# Patient Record
Sex: FEMALE | Race: BLACK OR AFRICAN AMERICAN | Employment: FULL TIME | URBAN - METROPOLITAN AREA
[De-identification: names, ages, dates, MRNs, and addresses within clinical notes are randomized per-mention and may not be internally consistent; named-entity substitution may affect disease eponyms.]

---

## 2017-02-27 ENCOUNTER — HOSPITAL ENCOUNTER (OUTPATIENT)
Dept: LAB | Age: 27
Discharge: HOME | End: 2017-02-27
Payer: COMMERCIAL

## 2017-02-27 ENCOUNTER — OFFICE VISIT (OUTPATIENT)
Dept: INTERNAL MEDICINE CLINIC | Age: 27
End: 2017-02-27

## 2017-02-27 VITALS
OXYGEN SATURATION: 99 % | WEIGHT: 270 LBS | SYSTOLIC BLOOD PRESSURE: 124 MMHG | BODY MASS INDEX: 46.1 KG/M2 | HEIGHT: 64 IN | DIASTOLIC BLOOD PRESSURE: 84 MMHG | RESPIRATION RATE: 20 BRPM | TEMPERATURE: 98.5 F | HEART RATE: 80 BPM

## 2017-02-27 DIAGNOSIS — Z12.4 SCREENING FOR MALIGNANT NEOPLASM OF CERVIX: ICD-10-CM

## 2017-02-27 DIAGNOSIS — Z01.419 WELL WOMAN EXAM WITH ROUTINE GYNECOLOGICAL EXAM: ICD-10-CM

## 2017-02-27 DIAGNOSIS — Z12.39 SCREENING FOR MALIGNANT NEOPLASM OF BREAST: ICD-10-CM

## 2017-02-27 PROCEDURE — 88175 CYTOPATH C/V AUTO FLUID REDO: CPT | Performed by: FAMILY MEDICINE

## 2017-02-27 RX ORDER — TRIAMCINOLONE ACETONIDE 1 MG/G
OINTMENT TOPICAL
Qty: 45 G | Refills: 11 | Status: SHIPPED | OUTPATIENT
Start: 2017-02-27

## 2017-02-27 RX ORDER — ALBUTEROL SULFATE 90 UG/1
1 AEROSOL, METERED RESPIRATORY (INHALATION)
Qty: 1 INHALER | Refills: 11 | Status: SHIPPED | OUTPATIENT
Start: 2017-02-27 | End: 2017-06-07 | Stop reason: SDUPTHER

## 2017-02-27 NOTE — PROGRESS NOTES
1. Have you been to the ER, urgent care clinic since your last visit? Hospitalized since your last visit? No.    2. Have you seen or consulted any other health care providers outside of the [address] [place] [address] since your last visit? Include any pap smears or colon screening.  No.

## 2017-02-27 NOTE — MR AVS SNAPSHOT
Visit Information Date & Time Provider Department Dept. Phone Encounter #  
 2/27/2017  3:00 PM Annabelle Harvey, [address]  [id number] Follow-up Instructions Return if symptoms worsen or fail to improve. Upcoming Health Maintenance Date Due DTaP/Tdap/Td series (1 - Tdap) 2/2/2010 PAP AKA CERVICAL CYTOLOGY 10/2/2016 Allergies as of 2/27/2017  Review Complete On: 2/27/2017 By: Annabelle Harvey MD  
 No Known Allergies Current Immunizations  Never Reviewed No immunizations on file. Not reviewed this visit You Were Diagnosed With   
  
 Codes Comments Well woman exam with routine gynecological exam     ICD-10-CM: J33.267 ICD-9-CM: V72.31 [V72.31] Screening for malignant neoplasm of cervix     ICD-10-CM: Z12.4 ICD-9-CM: V76.2 Screening for malignant neoplasm of breast     ICD-10-CM: Z12.39 
ICD-9-CM: V76.10 Vitals BP  
  
  
  
  
  
 124/84 (BP 1 Location: Right arm, BP Patient Position: Sitting) Vitals History BMI and BSA Data Body Mass Index Body Surface Area  
 46.35 kg/m 2 2.35 m 2 Preferred Pharmacy Pharmacy Name Phone [facility] [address] [place] [phone] Your Updated Medication List  
  
   
This list is accurate as of: 2/27/17  3:50 PM.  Always use your most recent med list.  
  
  
  
  
 fluticasone 50 mcg/actuation nasal spray Commonly known as:  Imagene Poot 2 Sprays by Both Nostrils route daily. Indications: ALLERGIC RHINITIS  
  
 SUMAtriptan 100 mg tablet Commonly known as:  IMITREX Take 1 Tab by mouth once as needed for Migraine for up to 1 dose. triamcinolone acetonide 0.1 % ointment Commonly known as:  KENALOG Apply 2-3 times a day. Use thin layer. Prescriptions Sent to Pharmacy Refills triamcinolone acetonide (KENALOG) 0.1 % ointment 11 Sig: Apply 2-3 times a day. Use thin layer. Class: Normal  
 Pharmacy: [facility] [address], [facility] [address] [place] [address] #: [phone] We Performed the Following PAP (IMAGE GUIDED), LIQUID-BASED [[id number] Custom] Follow-up Instructions Return if symptoms worsen or fail to improve. Introducing [facility] & HEALTH SERVICES! [employer] introduces [employer] patient portal. Now you can access parts of your medical record, email your doctor's office, and request medication refills online. 1. In your internet browser, go to [email or web address] 2. Click on the First Time User? Click Here link in the Sign In box. You will see the New Member Sign Up page. 3. Enter your Cortica Access Code exactly as it appears below. You will not need to use this code after youve completed the sign-up process. If you do not sign up before the expiration date, you must request a new code. · Cortica Access Code: [email or web address] Expires: 5/28/2017  3:05 PM 
 
4. Enter the last four digits of your Social Security Number (xxxx) and Date of Birth (mm/dd/yyyy) as indicated and click Submit. You will be taken to the next sign-up page. 5. Create a Cortica ID. This will be your Cortica login ID and cannot be changed, so think of one that is secure and easy to remember. 6. Create a Cortica password. You can change your password at any time. 7. Enter your Password Reset Question and Answer. This can be used at a later time if you forget your password. 8. Enter your e-mail address. You will receive e-mail notification when new information is available in [address] 9. Click Sign Up. You can now view and download portions of your medical record. 10. Click the Download Summary menu link to download a portable copy of your medical information. If you have questions, please visit the Frequently Asked Questions section of the [employer] website. Remember, [employer] is NOT to be used for urgent needs. For medical emergencies, dial 911. Now available from your iPhone and Android! Please provide this summary of care documentation to your next provider. Your primary care clinician is listed as Emily Brito. If you have any questions after today's visit, please call [phone].

## 2017-02-27 NOTE — PROGRESS NOTES
Subjective:   28 y.o. female for Well Woman Check. Patient's last menstrual period was 02/22/2017. Dx'd with uterine prolapse. Was getting PT for it and want sot go back. Regular menses. Doing med-weight loss and her iron counts were low. Started on Jan 23rd. Social History: single partner, contraception - none. ROS:  Feeling well. No dyspnea or chest pain on exertion. No abdominal pain, change in bowel habits, black or bloody stools. No urinary tract symptoms. GYN ROS: normal menses, no abnormal bleeding, pelvic pain or discharge, no breast pain or new or enlarging lumps on self exam. No neurological complaints. Objective:     Visit Vitals    /84 (BP 1 Location: Right arm, BP Patient Position: Sitting)    Pulse 80    Temp 98.5 °F (36.9 °C) (Oral)    Resp 20    Ht 5' 4\" (1.626 m)    Wt 270 lb (122.5 kg)    LMP 02/22/2017    SpO2 99%    BMI 46.35 kg/m2     The patient appears well, alert, oriented x 3, in no distress. ENT normal.  Neck supple. No adenopathy or thyromegaly. ANNE. Lungs are clear, good air entry, no wheezes, rhonchi or rales. S1 and S2 normal, no murmurs, regular rate and rhythm. Abdomen soft without tenderness, guarding, mass or organomegaly. Extremities show no edema, normal peripheral pulses. Neurological is normal, no focal findings. BREAST EXAM: breasts appear normal, no suspicious masses, no skin or nipple changes or axillary nodes    PELVIC EXAM: normal external genitalia, vulva, vagina, cervix, uterus and adnexa    Assessment/Plan:   well woman  pap smear  return annually or prn    ICD-10-CM ICD-9-CM    1. Well woman exam with routine gynecological exam Z01.419 V72.31 PAP (IMAGE GUIDED), LIQUID-BASED    [V72.31]   2. Screening for malignant neoplasm of cervix Z12.4 V76.2 PAP (IMAGE GUIDED), LIQUID-BASED   3. Screening for malignant neoplasm of breast Z12.39 V76.10    .

## 2017-03-06 RX ORDER — METRONIDAZOLE 500 MG/1
500 TABLET ORAL 2 TIMES DAILY
Qty: 14 TAB | Refills: 0 | Status: SHIPPED | OUTPATIENT
Start: 2017-03-06 | End: 2017-03-13

## 2017-06-07 ENCOUNTER — OFFICE VISIT (OUTPATIENT)
Dept: INTERNAL MEDICINE CLINIC | Age: 27
End: 2017-06-07

## 2017-06-07 VITALS
WEIGHT: 257 LBS | OXYGEN SATURATION: 100 % | HEIGHT: 64 IN | BODY MASS INDEX: 43.87 KG/M2 | HEART RATE: 71 BPM | TEMPERATURE: 97.9 F | SYSTOLIC BLOOD PRESSURE: 122 MMHG | DIASTOLIC BLOOD PRESSURE: 85 MMHG | RESPIRATION RATE: 20 BRPM

## 2017-06-07 DIAGNOSIS — J30.1 NON-SEASONAL ALLERGIC RHINITIS DUE TO POLLEN: ICD-10-CM

## 2017-06-07 DIAGNOSIS — N97.0 INFERTILITY ASSOCIATED WITH ANOVULATION: Primary | ICD-10-CM

## 2017-06-07 RX ORDER — FLUTICASONE PROPIONATE 50 MCG
2 SPRAY, SUSPENSION (ML) NASAL DAILY
Qty: 1 BOTTLE | Refills: 11 | Status: SHIPPED | OUTPATIENT
Start: 2017-06-07

## 2017-06-07 RX ORDER — ALBUTEROL SULFATE 90 UG/1
1 AEROSOL, METERED RESPIRATORY (INHALATION)
Qty: 1 INHALER | Refills: 11 | Status: SHIPPED | OUTPATIENT
Start: 2017-06-07

## 2017-06-07 NOTE — MR AVS SNAPSHOT
Visit Information Date & Time Provider Department Dept. Phone Encounter #  
 6/7/2017  2:15 PM Aysha Villanueva, [address] [id number] Your Appointments 2/23/2018  3:15 PM  
ROUTINE CARE with Aysha Villanueva MD  
[address]) Appt Note: 1 year follow up [place] [address] [place] [address] [id number] [phone]  
  
   
 [place] [address] [place] Upcoming Health Maintenance Date Due DTaP/Tdap/Td series (1 - Tdap) 2/2/2010 INFLUENZA AGE 9 TO ADULT 8/1/2017 PAP AKA CERVICAL CYTOLOGY 2/27/2020 Allergies as of 6/7/2017  Review Complete On: 6/7/2017 By: Cooper Blanton LPN No Known Allergies Current Immunizations  Never Reviewed No immunizations on file. Not reviewed this visit You Were Diagnosed With   
  
 Codes Comments Infertility associated with anovulation    -  Primary ICD-10-CM: N97.0 ICD-9-CM: 628.0 Non-seasonal allergic rhinitis due to pollen     ICD-10-CM: J30.1 ICD-9-CM: 477.0 Vitals BP Pulse Temp Resp Height(growth percentile) Weight(growth percentile) 122/85 (BP 1 Location: Left arm, BP Patient Position: Sitting) 71 97.9 °F (36.6 °C) (Oral) 20 5' 4\" (1.626 m) 257 lb (116.6 kg) SpO2 BMI OB Status Smoking Status 100% 44.11 kg/m2 Having regular periods Never Smoker Vitals History BMI and BSA Data Body Mass Index Body Surface Area  
 44.11 kg/m 2 2.29 m 2 Preferred Pharmacy Pharmacy Name Phone [facility] [address] [facility], [address] [place] [address] [phone] Your Updated Medication List  
  
   
This list is accurate as of: 6/7/17  3:32 PM.  Always use your most recent med list.  
  
  
  
  
 albuterol 90 mcg/actuation inhaler Commonly known as:  PROVENTIL HFA, VENTOLIN HFA, PROAIR HFA  
 Take 1 Puff by inhalation every six (6) hours as needed for Wheezing. fluticasone 50 mcg/actuation nasal spray Commonly known as:  Nya Lynch 2 Sprays by Both Nostrils route daily. Indications: ALLERGIC RHINITIS  
  
 triamcinolone acetonide 0.1 % ointment Commonly known as:  KENALOG Apply 2-3 times a day. Use thin layer. Prescriptions Sent to Pharmacy Refills  
 albuterol (PROVENTIL HFA, VENTOLIN HFA, PROAIR HFA) 90 mcg/actuation inhaler 11 Sig: Take 1 Puff by inhalation every six (6) hours as needed for Wheezing. Class: Normal  
 Pharmacy: [facility],  [facility] Ph #: [phone] Route: Inhalation  
 fluticasone (FLONASE) 50 mcg/actuation nasal spray 11 Si Sprays by Both Nostrils route daily. Indications: ALLERGIC RHINITIS Class: Normal  
 [facility],  [facility] [address] Ph #: [phone] Route: Both Nostrils We Performed the Following REFERRAL TO ENDOCRINOLOGY [PVA38 Custom] Comments:  
 Please evaluate patient for infertility Referral Information Referral ID Referred By Referred To  
  
 [id number] [address], [address] [place] [address] [place] [address], [address] [place] [address] Pkwy Phone: [phone] Fax: [phone] Visits Status Start Date End Date 1 New Request 17 If your referral has a status of pending review or denied, additional information will be sent to support the outcome of this decision. Introducing [facility] & HEALTH SERVICES! [employer] introduces [employer] patient portal. Now you can access parts of your medical record, email your doctor's office, and request medication refills online. 1. In your internet browser, go to [email or web address]://"[email or web address][email or web address]". [email or web address]/"[email or web address][employer]" 2. Click on the First Time User? Click Here link in the Sign In box. You will see the New Member Sign Up page. 3. Enter your [email or web address] Access Code exactly as it appears below. You will not need to use this code after youve completed the sign-up process. If you do not sign up before the expiration date, you must request a new code. · [email or web address] Access Code: [email or web address] 
Expires: 9/5/2017  2:20 PM 
 
4. Enter the last four digits of your Social Security Number (xxxx) and Date of Birth (mm/dd/yyyy) as indicated and click Submit. You will be taken to the next sign-up page. 5. Create a magnetic.io ID. This will be your magnetic.io login ID and cannot be changed, so think of one that is secure and easy to remember. 6. Create a magnetic.io password. You can change your password at any time. 7. Enter your Password Reset Question and Answer. This can be used at a later time if you forget your password. 8. Enter your e-mail address. You will receive e-mail notification when new information is available in [address] 9. Click Sign Up. You can now view and download portions of your medical record. 10. Click the Download Summary menu link to download a portable copy of your medical information. If you have questions, please visit the Frequently Asked Questions section of the [email or web address] website. Remember, magnetic.io is NOT to be used for urgent needs. For medical emergencies, dial 911. Now available from your iPhone and Android! Please provide this summary of care documentation to your next provider. Your primary care clinician is listed as [address]. If you have any questions after today's visit, please call [phone].

## 2017-06-07 NOTE — PROGRESS NOTES
1. Have you been to the ER, urgent care clinic since your last visit? Hospitalized since your last visit? No.    2. Have you seen or consulted any other health care providers outside of the [facility] since your last visit? Include any pap smears or colon screening.  No.

## 2017-06-07 NOTE — PROGRESS NOTES
Sadia Burnham is a 28 y.o. female and presents with Follow-up; Ear Fullness; and Medication Refill  . No complaints. Had some questions about fertility. Her BF hasn't had any kids. She was pregnant once 10 yrs ago resulting in termination. Menses are regular q28 days. Review of Systems  Constitutional: negative for fevers, chills, anorexia and weight loss  Eyes:   negative for visual disturbance and irritation  ENT:   negative for tinnitus,sore throat,nasal congestion,ear pains. hoarseness  Respiratory:  negative for cough, hemoptysis, dyspnea,wheezing  CV:   negative for chest pain, palpitations, lower extremity edema  GI:   negative for nausea, vomiting, diarrhea, abdominal pain,melena  Endo:               negative for polyuria,polydipsia,polyphagia,heat intolerance  Genitourinary: negative for frequency, dysuria and hematuria  Integument:  negative for rash and pruritus  Hematologic:  negative for easy bruising and gum/nose bleeding  Musculoskel: negative for myalgias, arthralgias, back pain, muscle weakness, joint pain  Neurological:  negative for headaches, dizziness, vertigo, memory problems and gait   Behavl/Psych: negative for feelings of anxiety, depression, mood changes    History reviewed. No pertinent past medical history. History reviewed. No pertinent surgical history. Social History     Social History    Marital status: SINGLE     Spouse name: N/A    Number of children: N/A    Years of education: N/A     Social History Main Topics    Smoking status: Never Smoker    Smokeless tobacco: Never Used    Alcohol use No    Drug use: None    Sexual activity: Not Asked     Other Topics Concern    None     Social History Narrative     Current Outpatient Prescriptions   Medication Sig Dispense Refill    albuterol (PROVENTIL HFA, VENTOLIN HFA, PROAIR HFA) 90 mcg/actuation inhaler Take 1 Puff by inhalation every six (6) hours as needed for Wheezing.  1 Inhaler 11    fluticasone (FLONASE) 50 mcg/actuation nasal spray 2 Sprays by Both Nostrils route daily. Indications: ALLERGIC RHINITIS 1 Bottle 11    triamcinolone acetonide (KENALOG) 0.1 % ointment Apply 2-3 times a day. Use thin layer. 45 g 11     No Known Allergies    Objective:  Visit Vitals    /85 (BP 1 Location: Left arm, BP Patient Position: Sitting)    Pulse 71    Temp 97.9 °F (36.6 °C) (Oral)    Resp 20    Ht 5' 4\" (1.626 m)    Wt 257 lb (116.6 kg)    SpO2 100%    BMI 44.11 kg/m2     Physical Exam:   General appearance - alert, well appearing, and in no distress  Mental status - alert, oriented to person, place, and time  Chest - clear to auscultation, no wheezes, rales or rhonchi, symmetric air entry   Heart - normal rate, regular rhythm, normal S1, S2, no murmurs, rubs, clicks or gallops   Abdomen - soft, nontender, nondistended, no masses or organomegaly  Lymph- no adenopathy palpable  Ext-peripheral pulses normal, no pedal edema, no clubbing or cyanosis  Skin-Warm and dry. no hyperpigmentation, vitiligo, or suspicious lesions  Neuro -alert, oriented, normal speech, no focal findings or movement disorder noted      Results for orders placed or performed in visit on 09/14/15   AMB POC GLUCOSE BLOOD, BY GLUCOSE MONITORING DEVICE   Result Value Ref Range    Glucose POC 98 mg/dL   AMB POC HEMOGLOBIN (HGB)   Result Value Ref Range    Hemoglobin (POC) 10.7        Assessment/Plan:    ICD-10-CM ICD-9-CM    1.  Infertility associated with anovulation N97.0 628.0 REFERRAL TO ENDOCRINOLOGY   2. Non-seasonal allergic rhinitis due to pollen J30.1 477.0 fluticasone (FLONASE) 50 mcg/actuation nasal spray     Orders Placed This Encounter    REFERRAL TO ENDOCRINOLOGY     Referral Priority:   Routine     Referral Type:   Consultation     Referral Reason:   Specialty Services Required     Referred to Provider:   Renaldo Aguayo MD    albuterol (PROVENTIL HFA, VENTOLIN HFA, PROAIR HFA) 90 mcg/actuation inhaler     Sig: Take 1 Puff by inhalation every six (6) hours as needed for Wheezing. Dispense:  1 Inhaler     Refill:  11    fluticasone (FLONASE) 50 mcg/actuation nasal spray     Si Sprays by Both Nostrils route daily.  Indications: ALLERGIC RHINITIS     Dispense:  1 Bottle     Refill:  11     Asthma-Well controlled  Allergic rhinitis-flonase  Infertility-RE referral    Follow-up Disposition: Not on File

## 2017-09-06 ENCOUNTER — HOSPITAL ENCOUNTER (OUTPATIENT)
Dept: NUTRITION | Age: 27
Discharge: HOME | End: 2017-09-06
Attending: INTERNAL MEDICINE
Payer: COMMERCIAL

## 2017-09-06 DIAGNOSIS — E66.9 OBESITY: ICD-10-CM

## 2017-09-06 PROCEDURE — 97802 MEDICAL NUTRITION INDIV IN: CPT | Performed by: DIETITIAN, REGISTERED

## 2017-09-06 NOTE — PROGRESS NOTES
[facility] Assessment - Initial Nutrition Evaluation   DATE: 2017      REFERRING PHYSICIAN: Dr. Richard Neal  NAME: Clarisse Hilario : [id number] AGE: 28 y.o. GENDER: female  REASON FOR VISIT: Obesity    ASSESSMENT:  Past Medical Hx: Asthma      LABS:   No results found for: HBA1C, HGBE8, TBO2ZFOQ, VHS7ZCDJ    MEDICATIONS/SUPPLEMENTS:   [unfilled]  Prior to Admission medications    Medication Sig Start Date End Date Taking? Authorizing Provider   albuterol (PROVENTIL HFA, VENTOLIN HFA, PROAIR HFA) 90 mcg/actuation inhaler Take 1 Puff by inhalation every six (6) hours as needed for Wheezing. 17   Mak Aparicio MD   fluticasone (FLONASE) 50 mcg/actuation nasal spray 2 Sprays by Both Nostrils route daily. Indications: ALLERGIC RHINITIS 17   Mak Aparicio MD   triamcinolone acetonide (KENALOG) 0.1 % ointment Apply 2-3 times a day. Use thin layer. 17   Mak Aparicio MD       FOOD ALLERGIES/INTOLERANCES: none noted    ANTHROPOMETRICS:    Ht Readings from Last 1 Encounters:   17 5' 4\" (1.626 m)      Wt Readings from Last 1 Encounters:   17 116.6 kg (257 lb)   Today's wt: 265 lbs (120.5 kg- RD scale)   IBW:120 # +/- 10%  %IBW: 221 % +/- 10%    BMI: 45.6 Category: Obesity Class III    Reported Wt Hx:  Pt reports 30 lb wt loss earlier this year following MediWeight Loss program; gained 17 lbs back since discontinuing program.    Estimated Nutritional Needs:   9373-3915 kcal/day (MSJ x 1.2-1.3 - 1000) to promote 2 lb wt loss per week    Reported Diet Hx:  Pt states eating 1-3 meals a day   24 Hour Diet Recall  Breakfast 10:30 Protein shake or 3 eggs, grits or oatmeal, or skips   Lunch 3pm Grilled cheese, broccoli or tuna, carrots   Dinner 6pm Chicken, yams, brussel sprouts, broccoli ([employer] hot bar)   Snacks  none   Beverages  Water, tea, coffee     Exercise/Physical Actvity:  No formal exercise    Environmental/Social:  Pt works FT at [employer]. Lives with boyfriend. NUTRITION DIAGNOSIS:  Obesity r/t excessive caloric intake as evidenced by pt with BMI of 45.6    NUTRITION ASSESSMENT / INTERVENTION:  Pt being seen for obesity. Pt states on various diets over the years- most recent was MediWeight Loss; pt lost 30 lbs and then gained back over half the weight lost after coming off the plan. Has also tried fasting for a week- only drinking water and using magnesium citrate; drinking apple cider vinegar, taking OTC dietary supplements. Discussed the importance of developing a healthy eating plan/healthy lifestyle versus a \"diet\" that is not sustainable over time. Reviewed healthy plate method (provided handout)- 1/4 lean protein, 1/4 complex carb, 1/2 non-starchy vegetables; recommended pt eat 3 meals a day (no skipping). Per pt request reviewed estimated needs for calories, protein, carbohydrate and fat; pt plans on using [facility] to track food intake. Reviewed proper portion sizes (provided handout) and came up with basic meal/snack plan to follow. Encouraged pt to set goals and follow up, but pt not interested in follow up appointment. It appeared that pt wanted a \"quick fix\" for weight loss, referring back to dietary supplements/crash diets during conversation. Provided pt with contact information if interested in follow up or had additional questions. Specific tips and techniques to facilitate compliance with above recommendations were provided and discussed. Pt was strongly encourage to begin making necessary changes now, and re-visit the dietitian prn. If further details are desired please feel free to contact me at [phone]. This phone number was also provided to the patient for any further questions or concerns.             Octavio Pisano RD